# Patient Record
Sex: FEMALE | Race: WHITE | URBAN - METROPOLITAN AREA
[De-identification: names, ages, dates, MRNs, and addresses within clinical notes are randomized per-mention and may not be internally consistent; named-entity substitution may affect disease eponyms.]

---

## 2021-01-01 ENCOUNTER — APPOINTMENT (OUTPATIENT)
Dept: CT IMAGING | Age: 51
End: 2021-01-01
Payer: MEDICAID

## 2021-01-01 ENCOUNTER — HOSPITAL ENCOUNTER (INPATIENT)
Age: 51
LOS: 1 days | DRG: 064 | End: 2021-08-15
Attending: INTERNAL MEDICINE | Admitting: INTERNAL MEDICINE
Payer: MEDICAID

## 2021-01-01 ENCOUNTER — APPOINTMENT (OUTPATIENT)
Dept: GENERAL RADIOLOGY | Age: 51
DRG: 064 | End: 2021-01-01
Attending: INTERNAL MEDICINE
Payer: MEDICAID

## 2021-01-01 ENCOUNTER — HOSPITAL ENCOUNTER (EMERGENCY)
Age: 51
Discharge: ANOTHER HOSPITAL | End: 2021-08-14
Attending: EMERGENCY MEDICINE
Payer: MEDICAID

## 2021-01-01 ENCOUNTER — APPOINTMENT (OUTPATIENT)
Dept: GENERAL RADIOLOGY | Age: 51
End: 2021-01-01
Payer: MEDICAID

## 2021-01-01 ENCOUNTER — APPOINTMENT (OUTPATIENT)
Dept: CT IMAGING | Age: 51
DRG: 064 | End: 2021-01-01
Attending: INTERNAL MEDICINE
Payer: MEDICAID

## 2021-01-01 VITALS
BODY MASS INDEX: 24.26 KG/M2 | HEIGHT: 68 IN | TEMPERATURE: 99.3 F | OXYGEN SATURATION: 45 % | SYSTOLIC BLOOD PRESSURE: 134 MMHG | HEART RATE: 76 BPM | DIASTOLIC BLOOD PRESSURE: 101 MMHG | WEIGHT: 160.05 LBS

## 2021-01-01 VITALS
HEART RATE: 94 BPM | DIASTOLIC BLOOD PRESSURE: 83 MMHG | RESPIRATION RATE: 12 BRPM | TEMPERATURE: 93.5 F | WEIGHT: 167.55 LBS | SYSTOLIC BLOOD PRESSURE: 105 MMHG | OXYGEN SATURATION: 100 %

## 2021-01-01 DIAGNOSIS — I61.8 INTRACEREBELLAR AND POSTERIOR FOSSA HEMORRHAGE (HCC): Primary | ICD-10-CM

## 2021-01-01 DIAGNOSIS — I62.9 INTRACRANIAL HEMORRHAGE (HCC): Primary | ICD-10-CM

## 2021-01-01 DIAGNOSIS — I61.4 INTRACEREBELLAR AND POSTERIOR FOSSA HEMORRHAGE (HCC): Primary | ICD-10-CM

## 2021-01-01 DIAGNOSIS — I46.9 CARDIOPULMONARY ARREST WITH SUCCESSFUL RESUSCITATION (HCC): ICD-10-CM

## 2021-01-01 LAB
A/G RATIO: 1.6 (ref 1.1–2.2)
ACETAMINOPHEN LEVEL: <5 UG/ML (ref 10–30)
ALBUMIN SERPL-MCNC: 3.4 G/DL (ref 3.4–5)
ALBUMIN SERPL-MCNC: 3.9 G/DL (ref 3.4–5)
ALBUMIN SERPL-MCNC: 4.3 G/DL (ref 3.4–5)
ALP BLD-CCNC: 88 U/L (ref 40–129)
ALT SERPL-CCNC: 23 U/L (ref 10–40)
ANION GAP SERPL CALCULATED.3IONS-SCNC: 10 MMOL/L (ref 3–16)
ANION GAP SERPL CALCULATED.3IONS-SCNC: 15 MMOL/L (ref 3–16)
ANION GAP SERPL CALCULATED.3IONS-SCNC: 23 MMOL/L (ref 3–16)
AST SERPL-CCNC: 34 U/L (ref 15–37)
BASE EXCESS VENOUS: -13 MMOL/L (ref -3–3)
BASOPHILS ABSOLUTE: 0 K/UL (ref 0–0.2)
BASOPHILS ABSOLUTE: 0 K/UL (ref 0–0.2)
BASOPHILS RELATIVE PERCENT: 0.2 %
BASOPHILS RELATIVE PERCENT: 0.7 %
BILIRUB SERPL-MCNC: 0.4 MG/DL (ref 0–1)
BUN BLDV-MCNC: 10 MG/DL (ref 7–20)
BUN BLDV-MCNC: 14 MG/DL (ref 7–20)
BUN BLDV-MCNC: 17 MG/DL (ref 7–20)
CALCIUM SERPL-MCNC: 10.3 MG/DL (ref 8.3–10.6)
CALCIUM SERPL-MCNC: 8.6 MG/DL (ref 8.3–10.6)
CALCIUM SERPL-MCNC: 9.4 MG/DL (ref 8.3–10.6)
CHLORIDE BLD-SCNC: 102 MMOL/L (ref 99–110)
CHLORIDE BLD-SCNC: 112 MMOL/L (ref 99–110)
CHLORIDE BLD-SCNC: 119 MMOL/L (ref 99–110)
CO2: 14 MMOL/L (ref 21–32)
CO2: 22 MMOL/L (ref 21–32)
CO2: 26 MMOL/L (ref 21–32)
CREAT SERPL-MCNC: 1 MG/DL (ref 0.6–1.1)
CREAT SERPL-MCNC: 1.1 MG/DL (ref 0.6–1.1)
CREAT SERPL-MCNC: 1.2 MG/DL (ref 0.6–1.1)
D DIMER: >1.95 UG/ML FEU
EKG ATRIAL RATE: 103 BPM
EKG ATRIAL RATE: 103 BPM
EKG ATRIAL RATE: 110 BPM
EKG DIAGNOSIS: NORMAL
EKG P AXIS: 60 DEGREES
EKG P AXIS: 64 DEGREES
EKG P AXIS: 68 DEGREES
EKG P-R INTERVAL: 126 MS
EKG P-R INTERVAL: 144 MS
EKG P-R INTERVAL: 152 MS
EKG Q-T INTERVAL: 364 MS
EKG Q-T INTERVAL: 378 MS
EKG Q-T INTERVAL: 420 MS
EKG QRS DURATION: 104 MS
EKG QRS DURATION: 110 MS
EKG QRS DURATION: 92 MS
EKG QTC CALCULATION (BAZETT): 476 MS
EKG QTC CALCULATION (BAZETT): 511 MS
EKG QTC CALCULATION (BAZETT): 550 MS
EKG R AXIS: 28 DEGREES
EKG R AXIS: 34 DEGREES
EKG R AXIS: 50 DEGREES
EKG T AXIS: 237 DEGREES
EKG T AXIS: 58 DEGREES
EKG T AXIS: 61 DEGREES
EKG VENTRICULAR RATE: 103 BPM
EKG VENTRICULAR RATE: 103 BPM
EKG VENTRICULAR RATE: 110 BPM
EOSINOPHILS ABSOLUTE: 0 K/UL (ref 0–0.6)
EOSINOPHILS ABSOLUTE: 0.1 K/UL (ref 0–0.6)
EOSINOPHILS RELATIVE PERCENT: 0.4 %
EOSINOPHILS RELATIVE PERCENT: 1.1 %
ETHANOL: NORMAL MG/DL (ref 0–0.08)
GFR AFRICAN AMERICAN: 57
GFR AFRICAN AMERICAN: >60
GFR AFRICAN AMERICAN: >60
GFR NON-AFRICAN AMERICAN: 47
GFR NON-AFRICAN AMERICAN: 53
GFR NON-AFRICAN AMERICAN: 59
GLOBULIN: 2.1 G/DL
GLUCOSE BLD-MCNC: 168 MG/DL (ref 70–99)
GLUCOSE BLD-MCNC: 291 MG/DL (ref 70–99)
GLUCOSE BLD-MCNC: 454 MG/DL (ref 70–99)
GONADOTROPIN, CHORIONIC (HCG) QUANT: 5.2 MIU/ML
HCG QUALITATIVE: POSITIVE
HCO3 VENOUS: 14.7 MMOL/L (ref 23–29)
HCT VFR BLD CALC: 39.7 % (ref 36–48)
HCT VFR BLD CALC: 43.9 % (ref 36–48)
HCT VFR BLD CALC: 50.1 % (ref 36–48)
HEMOGLOBIN: 12.9 G/DL (ref 12–16)
HEMOGLOBIN: 15 G/DL (ref 12–16)
HEMOGLOBIN: 16.9 G/DL (ref 12–16)
INR BLD: 1.4 (ref 0.88–1.12)
LACTIC ACID, SEPSIS: 10.2 MMOL/L (ref 0.4–1.9)
LACTIC ACID: 1.8 MMOL/L (ref 0.4–2)
LYMPHOCYTES ABSOLUTE: 0.4 K/UL (ref 1–5.1)
LYMPHOCYTES ABSOLUTE: 2.4 K/UL (ref 1–5.1)
LYMPHOCYTES RELATIVE PERCENT: 44.5 %
LYMPHOCYTES RELATIVE PERCENT: 6.1 %
MAGNESIUM: 2 MG/DL (ref 1.8–2.4)
MAGNESIUM: 2.3 MG/DL (ref 1.8–2.4)
MCH RBC QN AUTO: 31.5 PG (ref 26–34)
MCH RBC QN AUTO: 32.6 PG (ref 26–34)
MCH RBC QN AUTO: 32.6 PG (ref 26–34)
MCHC RBC AUTO-ENTMCNC: 32.5 G/DL (ref 31–36)
MCHC RBC AUTO-ENTMCNC: 33.7 G/DL (ref 31–36)
MCHC RBC AUTO-ENTMCNC: 34.3 G/DL (ref 31–36)
MCV RBC AUTO: 95.1 FL (ref 80–100)
MCV RBC AUTO: 96.6 FL (ref 80–100)
MCV RBC AUTO: 97 FL (ref 80–100)
MONOCYTES ABSOLUTE: 0.2 K/UL (ref 0–1.3)
MONOCYTES ABSOLUTE: 0.3 K/UL (ref 0–1.3)
MONOCYTES RELATIVE PERCENT: 2.9 %
MONOCYTES RELATIVE PERCENT: 3.7 %
NEUTROPHILS ABSOLUTE: 2.8 K/UL (ref 1.7–7.7)
NEUTROPHILS ABSOLUTE: 6.2 K/UL (ref 1.7–7.7)
NEUTROPHILS RELATIVE PERCENT: 50.8 %
NEUTROPHILS RELATIVE PERCENT: 89.6 %
O2 SAT, VEN: 97 %
O2 THERAPY: ABNORMAL
PCO2, VEN: 35.7 MMHG (ref 40–50)
PDW BLD-RTO: 13.9 % (ref 12.4–15.4)
PDW BLD-RTO: 14.5 % (ref 12.4–15.4)
PDW BLD-RTO: 14.6 % (ref 12.4–15.4)
PH VENOUS: 7.22 (ref 7.35–7.45)
PHOSPHORUS: 4.3 MG/DL (ref 2.5–4.9)
PHOSPHORUS: 4.9 MG/DL (ref 2.5–4.9)
PLATELET # BLD: 103 K/UL (ref 135–450)
PLATELET # BLD: 120 K/UL (ref 135–450)
PLATELET # BLD: 95 K/UL (ref 135–450)
PLATELET SLIDE REVIEW: ADEQUATE
PMV BLD AUTO: 10.1 FL (ref 5–10.5)
PMV BLD AUTO: 9.4 FL (ref 5–10.5)
PMV BLD AUTO: 9.4 FL (ref 5–10.5)
PO2, VEN: 108.2 MMHG (ref 25–40)
POTASSIUM SERPL-SCNC: 3.2 MMOL/L (ref 3.5–5.1)
POTASSIUM SERPL-SCNC: 3.3 MMOL/L (ref 3.5–5.1)
POTASSIUM SERPL-SCNC: 4.4 MMOL/L (ref 3.5–5.1)
PRO-BNP: 231 PG/ML (ref 0–124)
PROTHROMBIN TIME: 27 SEC (ref 9.9–12.7)
RBC # BLD: 4.09 M/UL (ref 4–5.2)
RBC # BLD: 4.62 M/UL (ref 4–5.2)
RBC # BLD: 5.18 M/UL (ref 4–5.2)
RBC # BLD: NORMAL 10*6/UL
SALICYLATE, SERUM: 0.5 MG/DL (ref 15–30)
SODIUM BLD-SCNC: 139 MMOL/L (ref 136–145)
SODIUM BLD-SCNC: 149 MMOL/L (ref 136–145)
SODIUM BLD-SCNC: 155 MMOL/L (ref 136–145)
TCO2 CALC VENOUS: 16 MMOL/L
TOTAL PROTEIN: 5.5 G/DL (ref 6.4–8.2)
TROPONIN: 0.42 NG/ML
TROPONIN: 0.95 NG/ML
TROPONIN: <0.01 NG/ML
WBC # BLD: 14.3 K/UL (ref 4–11)
WBC # BLD: 5.5 K/UL (ref 4–11)
WBC # BLD: 7 K/UL (ref 4–11)

## 2021-01-01 PROCEDURE — 2580000003 HC RX 258: Performed by: STUDENT IN AN ORGANIZED HEALTH CARE EDUCATION/TRAINING PROGRAM

## 2021-01-01 PROCEDURE — 93005 ELECTROCARDIOGRAM TRACING: CPT | Performed by: STUDENT IN AN ORGANIZED HEALTH CARE EDUCATION/TRAINING PROGRAM

## 2021-01-01 PROCEDURE — 2500000003 HC RX 250 WO HCPCS: Performed by: STUDENT IN AN ORGANIZED HEALTH CARE EDUCATION/TRAINING PROGRAM

## 2021-01-01 PROCEDURE — 83735 ASSAY OF MAGNESIUM: CPT

## 2021-01-01 PROCEDURE — 83880 ASSAY OF NATRIURETIC PEPTIDE: CPT

## 2021-01-01 PROCEDURE — 70450 CT HEAD/BRAIN W/O DYE: CPT

## 2021-01-01 PROCEDURE — 6360000002 HC RX W HCPCS: Performed by: STUDENT IN AN ORGANIZED HEALTH CARE EDUCATION/TRAINING PROGRAM

## 2021-01-01 PROCEDURE — 85025 COMPLETE CBC W/AUTO DIFF WBC: CPT

## 2021-01-01 PROCEDURE — 84484 ASSAY OF TROPONIN QUANT: CPT

## 2021-01-01 PROCEDURE — 36592 COLLECT BLOOD FROM PICC: CPT

## 2021-01-01 PROCEDURE — 71045 X-RAY EXAM CHEST 1 VIEW: CPT

## 2021-01-01 PROCEDURE — 80179 DRUG ASSAY SALICYLATE: CPT

## 2021-01-01 PROCEDURE — 99284 EMERGENCY DEPT VISIT MOD MDM: CPT

## 2021-01-01 PROCEDURE — 99255 IP/OBS CONSLTJ NEW/EST HI 80: CPT | Performed by: PSYCHIATRY & NEUROLOGY

## 2021-01-01 PROCEDURE — 93010 ELECTROCARDIOGRAM REPORT: CPT | Performed by: INTERNAL MEDICINE

## 2021-01-01 PROCEDURE — 84702 CHORIONIC GONADOTROPIN TEST: CPT

## 2021-01-01 PROCEDURE — 80053 COMPREHEN METABOLIC PANEL: CPT

## 2021-01-01 PROCEDURE — 0BH18EZ INSERTION OF ENDOTRACHEAL AIRWAY INTO TRACHEA, VIA NATURAL OR ARTIFICIAL OPENING ENDOSCOPIC: ICD-10-PCS | Performed by: INTERNAL MEDICINE

## 2021-01-01 PROCEDURE — 94002 VENT MGMT INPAT INIT DAY: CPT

## 2021-01-01 PROCEDURE — 82947 ASSAY GLUCOSE BLOOD QUANT: CPT

## 2021-01-01 PROCEDURE — 80143 DRUG ASSAY ACETAMINOPHEN: CPT

## 2021-01-01 PROCEDURE — 94003 VENT MGMT INPAT SUBQ DAY: CPT

## 2021-01-01 PROCEDURE — 84703 CHORIONIC GONADOTROPIN ASSAY: CPT

## 2021-01-01 PROCEDURE — 36415 COLL VENOUS BLD VENIPUNCTURE: CPT

## 2021-01-01 PROCEDURE — 82803 BLOOD GASES ANY COMBINATION: CPT

## 2021-01-01 PROCEDURE — 2700000000 HC OXYGEN THERAPY PER DAY

## 2021-01-01 PROCEDURE — 82330 ASSAY OF CALCIUM: CPT

## 2021-01-01 PROCEDURE — 94761 N-INVAS EAR/PLS OXIMETRY MLT: CPT

## 2021-01-01 PROCEDURE — 83605 ASSAY OF LACTIC ACID: CPT

## 2021-01-01 PROCEDURE — 84132 ASSAY OF SERUM POTASSIUM: CPT

## 2021-01-01 PROCEDURE — APPNB45 APP NON BILLABLE 31-45 MINUTES: Performed by: NURSE PRACTITIONER

## 2021-01-01 PROCEDURE — 2000000000 HC ICU R&B

## 2021-01-01 PROCEDURE — 5A1935Z RESPIRATORY VENTILATION, LESS THAN 24 CONSECUTIVE HOURS: ICD-10-PCS | Performed by: INTERNAL MEDICINE

## 2021-01-01 PROCEDURE — 85379 FIBRIN DEGRADATION QUANT: CPT

## 2021-01-01 PROCEDURE — 80069 RENAL FUNCTION PANEL: CPT

## 2021-01-01 PROCEDURE — 99291 CRITICAL CARE FIRST HOUR: CPT | Performed by: INTERNAL MEDICINE

## 2021-01-01 PROCEDURE — 93005 ELECTROCARDIOGRAM TRACING: CPT | Performed by: EMERGENCY MEDICINE

## 2021-01-01 PROCEDURE — 85027 COMPLETE CBC AUTOMATED: CPT

## 2021-01-01 PROCEDURE — 82077 ASSAY SPEC XCP UR&BREATH IA: CPT

## 2021-01-01 RX ORDER — SODIUM CHLORIDE 0.9 % (FLUSH) 0.9 %
5-40 SYRINGE (ML) INJECTION EVERY 12 HOURS SCHEDULED
Status: DISCONTINUED | OUTPATIENT
Start: 2021-01-01 | End: 2021-01-01 | Stop reason: HOSPADM

## 2021-01-01 RX ORDER — LABETALOL HYDROCHLORIDE 5 MG/ML
10 INJECTION, SOLUTION INTRAVENOUS ONCE
Status: COMPLETED | OUTPATIENT
Start: 2021-01-01 | End: 2021-01-01

## 2021-01-01 RX ORDER — ACETAMINOPHEN 325 MG/1
650 TABLET ORAL EVERY 4 HOURS PRN
Status: DISCONTINUED | OUTPATIENT
Start: 2021-01-01 | End: 2021-01-01 | Stop reason: HOSPADM

## 2021-01-01 RX ORDER — SODIUM CHLORIDE 9 MG/ML
25 INJECTION, SOLUTION INTRAVENOUS PRN
Status: DISCONTINUED | OUTPATIENT
Start: 2021-01-01 | End: 2021-01-01 | Stop reason: HOSPADM

## 2021-01-01 RX ORDER — POTASSIUM CHLORIDE 29.8 MG/ML
20 INJECTION INTRAVENOUS
Status: COMPLETED | OUTPATIENT
Start: 2021-01-01 | End: 2021-01-01

## 2021-01-01 RX ORDER — ONDANSETRON 4 MG/1
4 TABLET, ORALLY DISINTEGRATING ORAL EVERY 8 HOURS PRN
Status: DISCONTINUED | OUTPATIENT
Start: 2021-01-01 | End: 2021-01-01

## 2021-01-01 RX ORDER — ONDANSETRON 2 MG/ML
4 INJECTION INTRAMUSCULAR; INTRAVENOUS EVERY 6 HOURS PRN
Status: DISCONTINUED | OUTPATIENT
Start: 2021-01-01 | End: 2021-01-01

## 2021-01-01 RX ORDER — SODIUM CHLORIDE, SODIUM LACTATE, POTASSIUM CHLORIDE, CALCIUM CHLORIDE 600; 310; 30; 20 MG/100ML; MG/100ML; MG/100ML; MG/100ML
INJECTION, SOLUTION INTRAVENOUS CONTINUOUS
Status: DISCONTINUED | OUTPATIENT
Start: 2021-01-01 | End: 2021-01-01

## 2021-01-01 RX ORDER — SODIUM CHLORIDE 9 MG/ML
INJECTION, SOLUTION INTRAVENOUS CONTINUOUS
Status: ACTIVE | OUTPATIENT
Start: 2021-01-01 | End: 2021-01-01

## 2021-01-01 RX ORDER — 0.9 % SODIUM CHLORIDE 0.9 %
500 INTRAVENOUS SOLUTION INTRAVENOUS ONCE
Status: DISCONTINUED | OUTPATIENT
Start: 2021-01-01 | End: 2021-01-01 | Stop reason: HOSPADM

## 2021-01-01 RX ORDER — SODIUM CHLORIDE 0.9 % (FLUSH) 0.9 %
5-40 SYRINGE (ML) INJECTION PRN
Status: DISCONTINUED | OUTPATIENT
Start: 2021-01-01 | End: 2021-01-01 | Stop reason: HOSPADM

## 2021-01-01 RX ORDER — LEVETIRACETAM 10 MG/ML
1000 INJECTION INTRAVASCULAR ONCE
Status: DISCONTINUED | OUTPATIENT
Start: 2021-01-01 | End: 2021-01-01 | Stop reason: HOSPADM

## 2021-01-01 RX ORDER — SODIUM CHLORIDE 9 MG/ML
50 INJECTION, SOLUTION INTRAVENOUS ONCE
Status: COMPLETED | OUTPATIENT
Start: 2021-01-01 | End: 2021-01-01

## 2021-01-01 RX ADMIN — SODIUM CHLORIDE 50 ML: 900 INJECTION, SOLUTION INTRAVENOUS at 05:00

## 2021-01-01 RX ADMIN — SODIUM CHLORIDE 3 MG/HR: 9 INJECTION, SOLUTION INTRAVENOUS at 03:24

## 2021-01-01 RX ADMIN — Medication 10 ML: at 23:29

## 2021-01-01 RX ADMIN — Medication 10 ML: at 09:43

## 2021-01-01 RX ADMIN — LABETALOL HYDROCHLORIDE 10 MG: 5 INJECTION, SOLUTION INTRAVENOUS at 01:18

## 2021-01-01 RX ADMIN — Medication 3560 UNITS: at 04:45

## 2021-01-01 RX ADMIN — POTASSIUM CHLORIDE 20 MEQ: 29.8 INJECTION, SOLUTION INTRAVENOUS at 23:47

## 2021-01-01 RX ADMIN — POTASSIUM CHLORIDE 20 MEQ: 29.8 INJECTION, SOLUTION INTRAVENOUS at 01:09

## 2021-01-01 RX ADMIN — SODIUM CHLORIDE: 9 INJECTION, SOLUTION INTRAVENOUS at 23:26

## 2021-01-01 RX ADMIN — LEVETIRACETAM 500 MG: 100 INJECTION, SOLUTION INTRAVENOUS at 00:51

## 2021-01-01 ASSESSMENT — PULMONARY FUNCTION TESTS
PIF_VALUE: 18
PIF_VALUE: 19
PIF_VALUE: 17
PIF_VALUE: 18
PIF_VALUE: 18
PIF_VALUE: 19
PIF_VALUE: 18
PIF_VALUE: 18
PIF_VALUE: 19
PIF_VALUE: 19
PIF_VALUE: 18
PIF_VALUE: 19
PIF_VALUE: 18
PIF_VALUE: 19
PIF_VALUE: 19
PIF_VALUE: 18
PIF_VALUE: 19
PIF_VALUE: 18
PIF_VALUE: 19
PIF_VALUE: 19

## 2021-08-14 PROBLEM — I62.9 INTRACRANIAL HEMORRHAGE (HCC): Status: ACTIVE | Noted: 2021-01-01

## 2021-08-14 NOTE — ED PROVIDER NOTES
[facility]  eMERGENCY dEPARTMENT eNCOUnter      Pt Name: Michael Coyle  MRN: [id number]  [place] 1970  Date of evaluation: 8/14/2021  Provider: Francesca Castelan MD    [address] [place] [address]       Chief Complaint   Patient presents with    Loss of Consciousness         CRITICAL CARE TIME   Total Critical Care time was a minimum of ninety minutes, excluding separately reportable procedures. There was a high probability of clinically significant/life threatening deterioration in the patient's condition which required my urgent intervention. Critical CARE includes my initial evaluation, ongoing bedside care, review of laboratory, EKG, chest x-ray, CT scan, consultation with neurosurgery, consultation with hospitalist, consultation with the air care physician and nurse, obtaining additional medical history from the family, no procedure time was included. HISTORY OF PRESENT ILLNESS  (Location/Symptom, Timing/Onset, Context/Setting, Quality, Duration, Modifying Factors, Severity.)   Michael Coyle is a 49 y.o. female who presents to the emergency department in cardiopulmonary arrest.  History obtained from EMS is that the call went out at 6:04 PM.  When they arrived on scene there was no bystander CPR. They state that individuals on scene told him she was last seen normal at 1:30 PM.  She was apparently in a bed. They went to wake her and found her unresponsive sometime between 5:30 PM and the time the call went out. There was no bystander CPR on their arrival.  The initial rhythm on arrival on scene was ventricular fibrillation. The basic squad gave 2 mg of Narcan intranasal.  They defibrillated once. The medic arrived on scene. An LMA was placed. A peripheral IV was established. The patient was transported here. In route the patient remained in ventricular fibrillation. She was defibrillated 2 additional times for a total of 3 defibrillations in route.   She was given 3 mg of epinephrine in route. No additional history obtained at the scene other than the patient had a history of hypertension. 2010: No history obtained from the patient's fiancé and daughter. When she got up this morning she felt fine. Around 1030 she began complaining of a headache that she called \"migraine\". She took some over-the-counter medications including naproxen. Her fiancé said her headache was so bad that she was screaming that the noises were bothering her and the light was bothering her eyes. They state that she laid down on the couch. Her fiancé and daughter went to work. That came back to check on her around 5:45 and found her unresponsive and could not arouse her. They state her blood pressure is generally high. Nursing Notes were reviewed and I agree. REVIEW OF SYSTEMS    (2-9 systems for level 4, 10 or more for level 5)     Unable to obtain. Patient is in cardiopulmonary arrest.  Except as noted above the remainder of the review of systems was reviewed and negative. PAST MEDICAL HISTORY   No past medical history on file. SURGICAL HISTORY     No past surgical history on file. CURRENT MEDICATIONS       There are no discharge medications for this patient. ALLERGIES     Patient has no allergy information on record. FAMILY HISTORY     No family history on file.        SOCIAL HISTORY       Social History     Socioeconomic History    Marital status: Unknown     Spouse name: Not on file    Number of children: Not on file    Years of education: Not on file    Highest education level: Not on file   Occupational History    Not on file   Tobacco Use    Smoking status: Not on file   Substance and Sexual Activity    Alcohol use: Not on file    Drug use: Not on file    Sexual activity: Not on file   Other Topics Concern    Not on file   Social History Narrative    Not on file     Social Determinants of Health     Financial Resource Strain:     Difficulty of Paying Living Expenses:    Food Insecurity:     Worried About Running Out of Food in the Last Year:     [address] in the Last Year:    Transportation Needs:     Lack of Transportation (Medical):  Lack of Transportation (Non-Medical):    Physical Activity:     Days of Exercise per Week:     Minutes of Exercise per Session:    Stress:     Feeling of Stress :    Social Connections:     Frequency of Communication with Friends and Family:     Frequency of Social Gatherings with Friends and Family:     Attends Jewish Services:     Active Member of Clubs or Organizations:     Attends Club or Organization Meetings:     Marital Status:    Intimate Partner Violence:     Fear of Current or Ex-Partner:     Emotionally Abused:     Physically Abused:     Sexually Abused:          PHYSICAL EXAM    (up to 7 for level 4, 8 or more for level 5)     ED Triage Vitals   BP Temp Temp src Pulse Resp SpO2 Height Weight   -- -- -- -- -- -- -- --       General: Unresponsive white female arrives with CPR in progress. No eye-opening, no verbal, no motor, GCS of 3. Head: Atraumatic. Eyes: Pupils 8 mm, nonreactive, midline. ENT: Facial trauma. Nose is clear. Poor dentition. No vomitus in the oropharynx. No gag reflex. Neck: Supple, no thyromegaly. Heart: Heart tones absent. Femoral pulses with CPR. Lungs: No spontaneous respirations. Breath sounds symmetrical with bagging with LMA in place. Musculoskeletal: No obvious external signs of trauma to the upper and lower extremities. No obvious edema. No palpable distal pulses. No palpable femoral pulses. Skin: Pale, cool, no diaphoresis. No cyanosis. Neuro: No eye-opening, no verbal, no motor, GCS of 3. DIFFERENTIAL DIAGNOSIS   Differential includes but is not limited to myocardial infarction, pulmonary embolism, drug overdose, electrolyte abnormality, intracerebral hemorrhage, sepsis, other.       DIAGNOSTIC RESULTS     EKG: All EKG's are interpreted by Amparo Buck MD in the absence of a cardiologist.    Sinus tachycardia, rate 103, lateral ischemic changes. Rhythm strip shows sinus tachycardia with a rate of 103, GA interval 144 ms,  ms with no other ectopy as interpreted by me. No old EKG available for comparison. RADIOLOGY:   Non-plain film images such as CT, Ultrasound and MRI are read by the radiologist. Plain radiographic images are visualized and preliminarily interpreted Amparo Buck MD with the below findings:      Interpretation per the Radiologist below, if available at the time of this note:    XR CHEST PORTABLE   Final Result   The ET tube was in satisfactory position, 4.1 cm above the trinh. No cardiomegaly, pneumonia or interstitial edema. No rib fracture or pneumothorax. Mild superior mediastinal widening was noted but this could be due to the   supine position. CT HEAD WO CONTRAST   Final Result   1. Acute posterior fossa hemorrhage causing cerebellar tonsillar herniation,   superior transtentorial herniation, and mild hydrocephalus. 2.  Diffuse cerebral edema. MRI would better evaluate for underlying diffuse   anoxic injury. Critical results were called by Dr. Torie Gastelum DO to Dr. Josiah Crook on   8/14/2021 at 20:29.                ED BEDSIDE ULTRASOUND:   Performed by ED Physician - none    LABS:  Labs Reviewed   BLOOD GAS, VENOUS - Abnormal; Notable for the following components:       Result Value    pH, Jorge 7.222 (*)     pCO2, Jorge 35.7 (*)     pO2, Jorge 108.2 (*)     HCO3, Venous 14.7 (*)     Base Excess, Jorge -13.0 (*)     All other components within normal limits    Narrative:     Performed at:  [facility]) - [address]   Phone [phone]   CBC WITH AUTO DIFFERENTIAL - Abnormal; Notable for the following components:    Platelets 95 (*)     All other components within normal limits Narrative:     Performed at:  [address] [place] [address]  [address] [facility]   Phone [phone]   COMPREHENSIVE METABOLIC PANEL - Abnormal; Notable for the following components:    Potassium 3.2 (*)     CO2 14 (*)     Anion Gap 23 (*)     Glucose 454 (*)     CREATININE 1.2 (*)     GFR Non- 47 (*)     GFR  57 (*)     Total Protein 5.5 (*)     All other components within normal limits    Narrative:     Jodi Villaseñor tel. [phone],  Chemistry results called to and read back by dr Fernando Jo, 08/14/2021 19:49, by  Sushant Faith  Performed at:  [facility]) [facility]  [address] [facility]   Phone [phone]   BRAIN NATRIURETIC PEPTIDE - Abnormal; Notable for the following components:    Pro- (*)     All other components within normal limits    Narrative:     Jodi Villaseñor tel. [phone],  Chemistry results called to and read back by dr Fernando Jo, 08/14/2021 19:49, by  Sushatn Faith  Performed at:  [facility]) [facility]  [address] [facility]   Phone [phone]   LACTATE, SEPSIS - Abnormal; Notable for the following components:    Lactic Acid, Sepsis 10.2 (*)     All other components within normal limits    Narrative:     Jodi Charleen tel. [phone],  Chemistry results called to and read back by dr Fernando Jo, 08/14/2021 19:49, by  Sushant Faith  Performed at:  [facility]) [facility]  [address] [facility]   Phone [phone]   D-DIMER, QUANTITATIVE - Abnormal; Notable for the following components:    D-Dimer, Quant >1.95 (*)     All other components within normal limits    Narrative:     Performed at:  [facility]) [facility]  [address] [facility]   Phone [phone]   PROTIME-INR - Abnormal; Notable for the following components:    Protime 27.0 (*) INR 1.40 (*)     All other components within normal limits    Narrative:     Performed at:  [facility]  [address] [facility] [employer] [address]   Phone [phone]   ACETAMINOPHEN LEVEL - Abnormal; Notable for the following components:    Acetaminophen Level <5 (*)     All other components within normal limits    Narrative:     Performed at:  [facility]  [address] [facility] [employer] [address]   Phone [phone]   SALICYLATE LEVEL - Abnormal; Notable for the following components:    Salicylate, Serum 0.5 (*)     All other components within normal limits    Narrative:     Performed at:  [facility]  [address] [employer] [address]   Phone [phone]   TROPONIN    Narrative:     Yaneth Velasco tel. [phone],  Chemistry results called to and read back by dr Ling Blanco, 08/14/2021 19:49, by  Candelario Apley  Performed at:  [facility]  [address] [facility]   Phone [phone]   HCG, SERUM, QUALITATIVE    Narrative:     Performed at:  [address] [place], [place] [address] [address] [facility]   Phone [phone]   ETHANOL    Narrative:     Performed at:  [address] [place] [address]  [address] [facility]   Phone [phone]   HCG, QUANTITATIVE, PREGNANCY    Narrative:     Performed at:  [facility]  [address] [facility]   Phone [phone]   LACTATE, SEPSIS   URINE DRUG SCREEN   URINE RT REFLEX TO CULTURE       All other labs were within normal range or not returned as of this dictation.     EMERGENCY DEPARTMENT COURSE and DIFFERENTIAL DIAGNOSIS/MDM:   Vitals:    Vitals:    08/14/21 2005 08/14/21 2015 08/14/21 2020 08/14/21 2050   BP: (!) 78/62 (!) 148/104 105/83    Pulse: 84 103 94    Resp: 10 13 12 Temp:    93.5 °F (34.2 °C)   TempSrc:    Rectal   SpO2: 100% 100% 100%    Weight:    167 lb 8.8 oz (76 kg)       On arrival CPR was continued. Records ministration was continued as documented. She was intubated as noted below with a 7.5 endotracheal tube on the first attempt. She was ventilated with a bag valve mask. Her initial rhythm here was ventricular fibrillation. She received 1 dose of epinephrine here. She received 3 defibrillations. After the second defibrillation she did have a transient rhythm consistent with PEA with no pulse, but it rapidly deteriorated into ventricular fibrillation. After her third defibrillation she had a return of rhythm with a pulse. At that point in time a central line was placed as documented below. He was given an amp of bicarb because her blood pressures were low and she had a prolonged downtime is likely profoundly acidotic. She had already gotten 1 L of normal saline. Another 500 cc bolus of saline was ordered while the Levophed drip was getting mixed and hung. Her initial work-up was ordered. 1952: Patient back from CT. CT reviewed by me. Large intracerebral hemorrhage in the posterior fossa. Call placed by me to access center to consult neurosurgery stat. 1955: Patient back from CT and reevaluated. Systolic blood pressure 68. VBG shows she is profoundly acidotic with a pH of 6.9. Amp of bicarb was ordered. Her Levophed was increased. 2005: Discussed with neurosurgery, Dr. Daniel Burt.  [facility]  Based on her clinical picture and her CT appearance we both feel that her prognosis is poor. I have discussed the presentation, resuscitation, CT findings and prognosis with the patient's fiancé and daughter. They understand that her prognosis is poor. The hospitalist[facility]intensivist [facility]. will be contacting me to accept the patient for transfer.     2025:  CT Head results called by Dr. Karla Fierro, [facility]. 2033:  Discussed with Dr. Amy Wall, [facility] patient for transfer. Due to her mildly elevated INR he did request Kcentra. We do not have Kcentra here at the facility. Due to her mildly decreased platelet count he recommended platelet transfusion. We do not have blood products here at this facility. Per our discussion we will dispatch your care. 2038: Discussed with  [facility]. They will transport. 2121: As air care was landing the patient went into asystole. CPR was initiated. She was resuscitated for approximately 10 to 12 minutes and was given epinephrine, bicarb, calcium. [facility] had hypertonic saline on board and it was administered. [facility] had fresh frozen plasma on board, and it has been started. The Keppra had not been given here yet, they started the [address]. This patient presented in cardiopulmonary arrest with a GCS of three as above. Additional history obtained from the family after they arrived was that she had a headache that started at 10:30 AM this morning. When she had gotten up early in the day she felt fine. She thought she had a \"migraine\" although she does not have a medical history of migraines. She does have a history of hypertension. She took some over-the-counter medications including naproxen. Her fiancé told me that she complained of the light hurting her eyes and the noises were bothering her because of her headache. He states that around one thirty they left the house both he and the patient's daughter to do some work. She was laying on the couch at the time. They came back around quarter ~6 to check on the patient and they could not arouse her. EMS states they were dispatched at 6:04 PM.  On their arrival to the scene the patient was in cardiopulmonary arrest and ventricular fibrillation. The initial BLS squad that responded gave her Narcan and defibrillated her once. A medic responded to the scene. LMA was placed. An IV was established. Patient was defibrillated and given epinephrine as described above. The patient arrived here approximately 45 to 50 minutes after the call went out. The patient was still undergoing CPR, she was pulseless and apneic. She was in ventricular fibrillation. We continued ALS protocol including epinephrine, bicarb, and defibrillation. She was intubated. We obtained ROSC. Her initial rhythm on the monitor looked like atrial fibrillation with RVR. Her initial blood pressures post resuscitation were low. Central venous access was obtained. She had already been given a liter of IV fluids at the time she was resuscitated. She was given another 500 cc of normal saline. She was started on Levophed. Once we were able to resuscitate her and stabilize her we took her immediately to CT scan. At that point in time I noted that she had a large posterior circulation intracerebral hemorrhage. I placed an immediate consult to neurosurgery. I talk with neurosurgery as documented above. He agreed that the patient's prognosis was poor based on her presentation, her prolonged resuscitation, her large posterior fossa bleed, and what appears to be verbal edema on CT. He agreed that she should be transferred to [facility]..  I spoke with the hospitalist at [facility]..  As noted above he requested Kcentra and platelets. We did not have Kcentra or platelets here. As agreed we dispatched air care. As they landed the patient arrested again. The air care physician and nurse assisted in resuscitation including as noted above administration of a unit of fresh frozen plasma and hypertonic saline which they provided. They also hung the [address] which had not been started yet. We were ultimately able to resuscitate the patient. She was ultimately transferred via air care to [facility]..  As noted above the patient's fiancé and daughter were here.   I discussed the patient's work-up, test results, and prognosis. They understand the prognosis and understand the need for transfer. CONSULTS:  IP CONSULT TO NEUROSURGERY    PROCEDURES:  Intubation    Date/Time: 8/14/2021 7:36 PM  Performed by: Chelsea Schirmer, MD  Authorized by: Chelsea Schirmer, MD     Consent:     Consent obtained:  Emergent situation  Pre-procedure details:     Patient status:  Unresponsive    Mallampati score:  II    Pretreatment medications:  None    Paralytics:  None  Procedure details:     Preoxygenation:  RUBIO/LMA    CPR in progress: yes      Intubation method:  Oral    Oral intubation technique:  Direct    Laryngoscope blade: Mac 4    Tube size (mm):  7.5    Tube type:  Cuffed    Number of attempts:  1    Cricoid pressure: no      Tube visualized through cords: yes    Placement assessment:     ETT to lip:  22    Tube secured with:  ETT sequeira    Breath sounds:  Equal and absent over the epigastrium    Placement verification: chest rise, CXR verification, equal breath sounds and ETCO2 detector      CXR findings:  ETT in proper place  Post-procedure details:     Patient tolerance of procedure: Tolerated well, no immediate complications  Central Line    Date/Time: 8/14/2021 7:37 PM  Performed by: Chelsea Schirmer, MD  Authorized by: Chelsea Schirmer, MD     Consent:     Consent obtained:  Emergent situation  Pre-procedure details:     Hand hygiene: Hand hygiene performed prior to insertion      Sterile barrier technique: All elements of maximal sterile technique followed      Skin preparation:  2% chlorhexidine    Skin preparation agent: Skin preparation agent completely dried prior to procedure    Anesthesia (see MAR for exact dosages):      Anesthesia method:  None  Procedure details:     Location:  R femoral    Patient position:  Flat    Procedural supplies:  Triple lumen    Catheter size:  7 Fr    Landmarks identified: yes      Ultrasound guidance: no      Number of attempts:  2    Successful placement: yes    Post-procedure details:     Post-procedure:  Dressing applied and line sutured    Assessment:  Blood return through all ports and free fluid flow    Patient tolerance of procedure: Tolerated well, no immediate complications  Comments:      Access was obtained when the patient's blood pressure was 54 systolic. The the access looked venous in appearance, the blood was dark, there was a slow drip of blood from the needle, I felt that it was the femoral vein and proceeded. FINAL IMPRESSION      1. Intracerebellar and posterior fossa hemorrhage ([address]    2. Cardiopulmonary arrest with successful resuscitation ([address]          DISPOSITION/PLAN   DISPOSITION        PATIENT REFERRED TO:  No follow-up provider specified. DISCHARGE MEDICATIONS:  There are no discharge medications for this patient.       (Please note that portions of this note were completed with a voice recognition program.  Efforts were made to edit the dictations but occasionally words are mis-transcribed.)    Monika Coto MD  Attending Emergency Physician        Daysi Dietz MD  08/14/21 2056

## 2021-08-15 PROBLEM — I46.9 CARDIAC ARREST (HCC): Status: ACTIVE | Noted: 2021-01-01

## 2021-08-15 PROBLEM — G93.1 ANOXIC BRAIN INJURY (HCC): Status: ACTIVE | Noted: 2021-01-01

## 2021-08-15 NOTE — CONSULTS
Neurology Consult Note  Reason for Consult: brain death  Chief complaint: brain death  Dr Fercho Rider, DO asked me to see Alejandro Bautista in consultation for evaluation of brain death. History is obtained via chart review. History of Present Illness:  Alejandro Bautista is a 49 y.o. female with hypertension who presents s/p cardiopulmonary arrest. EMS were called last night at 6:04 PM. She was apparently found in her bed around 5:45, having been last seen normal at 1:30 PM prior to her family leaving for work. She had been complaining of a headache with associated photophobia, phonophobia around 10:30 AM and took naproxen. There was no bystander CPR. She was found in ventricular fibrillation, and was in ventricular fibrillation upon arrival to the ER in [facility]. Head CT showed large posterior fossa ICH. She went into asystole prior to air care transport and was coded for another 10-12 mintues. She was transferred to [facility] for further evaluation. Medical History:  No past medical history on file. No past surgical history on file. No medications prior to admission. Not on File  No family history on file. Social History     Tobacco Use   Smoking Status Not on file     Social History     Substance and Sexual Activity   Drug Use Not on file     Social History     Substance and Sexual Activity   Alcohol Use Not on file       ROS:  Unable to assess given encephalopathy     Exam:  Blood pressure (!) 123/94, pulse 111, temperature 97.7 °F (36.5 °C), temperature source Rectal, resp. rate 16, height 5' 8\" (1.727 m), weight 160 lb 0.9 oz (72.6 kg), SpO2 98 %. Constitutional    Vital signs: BP, HR, and RR reviewed   General depressed mental status, no distress, well-nourished  Eyes: fundoscopic exam difficult given inability to cooperate  Cardiovascular: pulses symmetric in all 4 extremities. No peripheral edema.   Psychiatric: limited exam given encephalopathy but not agitated and no signs of hallucinations  Neurologic  Mental status: limited exam given encephalopathy  orientation unable to assess given comatose state  General fund of knowledge limited exam given encephalopathy   Memory limited exam given encephalopathy  Attention no regard for surroundings or environment  Language makes no attempt to speak or communicate  Comprehension not following any commands  CN2: Visual Fields: no blink to either side with threat  CN 3,4,6:  extraocular muscles absent. Pupils dilated at 8 mm and non-reactive  CN5: absent corneal reflexes bilaterally   CN7:face symmetric but exam limited by ET tube  CN8: hearing JASON given encephalopathy   CN9: limited exam given encephalopathy  CN11: limited exam given encephalopathy  CN12: limited exam given encephalopathy  Strength: 0/5 throughout  Deep tendon reflexes: absent  Sensory: no response to central pain or pain in upper or lower limbs  Cerebellar/coordination:limited exam given encephalopathy  Tone: reduced throughout  Gait: limited exam given encephalopathy and intubated with ventilator. Labs  Na-155  Lactate 10.2  Troponin 0.95  WBC 14.3  Platelets 497J  INR 1.4      Studies  Head CT 8/15/21  Overall decreased size of hemorrhage in the cerebellum, although still    prominent size (currently 3.3 cm, previously 5.7 cm).     Intraventricular hemorrhage is again seen.  No hydrocephalus. Scheduled Medications   sodium chloride flush  5-40 mL Intravenous 2 times per day    levetiracetam  500 mg Intravenous Q12H     Impression  Shannan Boston is a 49 y.o. female who presented s/p prolonged (>1h) cardiac arrest and subsequent recurrent arrest found to have large cerebellar ICH with evidence of herniation. She has no brainstem reflexes on my visit and is not breathing over the ventilator.      Recommendations:  - Continue supportive care until goals of care can be addressed with family  - Will need apnea testing  - INR goal < 1.4; Platelet count < 957C, SBP less than 160 mmHg  - Neurosurgery consulted w/o plans for intervention     A copy of this note was provided for Dr Karen Billings DO. Assessment and planning including emergent interventions necessary were discussed with FORD Myers at 8:30 AM.     I attempted to call every phone number listed in the patients chart in an attempt to contact family and answer any questions they may have but my calls were unanswered. Zee Cabrera NP  Neurology and Neurocritical care  [facility] Neurology line: [phone]  [employer]: [facility] Neurology & Neurocritical Care NPs    I spent 45 minutes in the care of this patient. Over 50% of that time was in face-to-face counseling regarding disease process, diagnostic testing, preventative measures, and answering patient and family questions.

## 2021-08-15 NOTE — PROGRESS NOTES
Hospitalist Progress Note      PCP: No primary care provider on file. Date of Admission: 8/14/2021    Chief Complaint: Found unresponsive    Hospital Course: 72-year-old female admitted to the hospital after found unresponsive requiring CPR for around an hour. Noted to have large posterior fossa hemorrhage causing cerebellar tonsillar herniation, superior trans-tentorial herniation    Subjective: Patient remains unresponsive on the ventilator      Medications:  Reviewed    Infusion Medications    niCARdipine 2.5 mg/hr (08/15/21 0600)    sodium chloride      norepinephrine Stopped (08/14/21 2329)     Scheduled Medications    sodium chloride flush  5-40 mL Intravenous 2 times per day    levetiracetam  500 mg Intravenous Q12H     PRN Meds: sodium chloride flush, sodium chloride, acetaminophen      Intake/Output Summary (Last 24 hours) at 8/15/2021 1317  Last data filed at 8/15/2021 1200  Gross per 24 hour   Intake 330.49 ml   Output 5050 ml   Net -4719.51 ml       Physical Exam Performed:    BP (!) 139/92   Pulse 125   Temp 99.3 °F (37.4 °C) (Rectal)   Resp 16   Ht 5' 8\" (1.727 m)   Wt 160 lb 0.9 oz (72.6 kg)   SpO2 98%   BMI 24.34 kg/m²     General appearance: Intubated  HEENT: Pupils dilated and nonreactive, no gag reflex noted  Neck: Supple, with full range of motion. No jugular venous distention. Trachea midline. Respiratory: Intubated, mechanical breath sounds  Cardiovascular: Regular rate and rhythm with normal S1/S2 without murmurs, rubs or gallops. Abdomen: Soft, non-tender, non-distended with normal bowel sounds. Skin: Skin color, texture, turgor normal.  No rashes or lesions.   Neurologic: Does not withdraw to pain  Psychiatric: Unable to assess  Capillary Refill: Brisk,3 seconds, normal   Peripheral Pulses: +2 palpable, equal bilaterally       Labs:   Recent Labs     08/14/21  1916 08/14/21  2307 08/15/21  0440   WBC 5.5 7.0 14.3*   HGB 12.9 15.0 16.9*   HCT 39.7 43.9 50.1*   PLT 95* 103* 120*     Recent Labs     08/14/21 1916 08/14/21 2308 08/15/21  0440    149* 155*   K 3.2* 3.3* 4.4    112* 119*   CO2 14* 22 26   BUN 10 14 17   CREATININE 1.2* 1.1 1.0   CALCIUM 8.6 9.4 10.3   PHOS  --  4.3 4.9     Recent Labs     08/14/21 1916   AST 34   ALT 23   BILITOT 0.4   ALKPHOS 88     Recent Labs     08/14/21 1916   INR 1.40*     Recent Labs     08/14/21 1916 08/14/21 2308 08/15/21  0440   TROPONINI <0.01 0.42* 0.95*       Urinalysis:    No results found for: [facility], BACTERIA, RBCUA, BLOODU, SPECGRAV, GLUCOSEU    Radiology:  XR CHEST 1 VIEW   Final Result      ET tube in proper position. NG tube in the fundus, its side port above the GE junction as described. It should be advanced further for better positioning. No acute cardiopulmonary findings. CT head without contrast   Final Result        Overall decreased size of hemorrhage in the cerebellum, although still    prominent size (currently 3.3 cm, previously 5.7 cm). Intraventricular hemorrhage is again seen. No hydrocephalus. Assessment:  V. fib cardiac arrest  Acute anoxic encephalopathy  Large intracranial hemorrhage  Acute respiratory failure requiring intubation  Hypernatremia with concern for diabetes insipidus  Lactic acidosis  Elevated troponins    Plan:  -Patient is currently intubated in the ICU. Patient's prognosis is extremely poor given the exam and concern for clinically brain dead status. She is still full code for now. Palliative care has been consulted. Recommend comfort care approach. Neurology and neurosurgery on board. No acute surgical intervention is planned for the [address]  -Continue Keppra for seizure prophylaxis  -Continue to monitor sodium levels    DVT Prophylaxis: SCD  Diet: Diet NPO  Code Status: Full Code    Disposition-  poor prognosis.   Recommend comfort care/hospice    Nancy Gonzáles MD

## 2021-08-15 NOTE — DEATH NOTES
Death Pronouncement Note  Patient's Name: Faiza Saucedo   Patient's YOB: 1970  MRN Number: [id number]    Admitting Provider: Cortez Rangel DO  Attending Provider: Abdi Portillo MD    Patient was examined and the following were absent: Pulses, Blood Pressure and Respiratory effort    I declared the patient dead on 08/15/2021 at 13:57       Preliminary Cause of Death:    Irreversible brain injury secondary to cardiac arrest.    Electronically signed by Lucas Angulo MD on 8/15/21 at 1:59 PM EDT

## 2021-08-15 NOTE — PROGRESS NOTES
4 Eyes Admission Assessment     I agree as the admission nurse that 2 RN's have performed a thorough Head to Toe Skin Assessment on the patient. ALL assessment sites listed below have been assessed on admission. Areas assessed by both nurses:   [x]   Head, Face, and Ears   [x]   Shoulders, Back, and Chest  [x]   Arms, Elbows, and Hands   [x]   Coccyx, Sacrum, and Ischium non-blanchable redness  [x]   Legs, Feet, and Heels        Does the Patient have Skin Breakdown?   No         Yogi Prevention initiated:  Yes   Wound Care Orders initiated:  NA      [facility] nurse consulted for Pressure Injury (Stage 3,4, Unstageable, DTI, NWPT, and Complex wounds) or Yogi score 18 or lower:  NA      Nurse 1 eSignature: Electronically signed by Akanksha aNva RN on 8/15/21 at 1:12 AM EDT    **SHARE this note so that the co-signing nurse is able to place an eSignature**    Nurse 2 eSignature: Electronically signed by Dontrell Lu RN on 8/15/21 at 3:05 PM EDT

## 2021-08-15 NOTE — PROGRESS NOTES
Pt admitted to ICU. Pt pupils equal, not reactive, no movement to painful stimulus. Pt temp 91.4, jeri hugger in place. A-line inserted per residents, levo paused at this time. Skin assessment completed, non-blanchable redness noted on sacrum, sacral heart applied. Safety precautions in place, bed alarm on. Will continue to monitor.

## 2021-08-15 NOTE — DISCHARGE SUMMARY
INTERNAL MEDICINE DEPARTMENT AT [address] [place] [address]  DISCHARGE SUMMARY    Patient ID: Francisco Javier Wang                                             Discharge Date: 8/15/2021   Patient's PCP: No primary care provider on file. Discharge Physician: Grace Short MD MD  Admit Date: 8/14/2021   Admitting Physician: Elis Silva DO    DISCHARGE DIAGNOSES:  Hospital Problems         Last Modified POA    Intracranial hemorrhage ([address] 8/14/2021 Yes    Cardiac arrest ([address] 8/15/2021 Yes    Anoxic brain injury ([address] 8/15/2021 Yes    Acute respiratory failure with hypoxia ([address] 8/15/2021 Yes        Hospital Course:      Francisco Javier Wang is a 49 y.o. female h/o HTN hepC admitted to the ED for LOC.    Patient was having very severe headache at 10:30 AM yesterday. Patient had taken some naproxen which did not relieve her symptoms. Around 1:30 PM patient laid on the couch and her fiancé and daughter went to work. Last seen normal was 1:30 PM.  When he returned from work around 5:45 PM patient was unarousable. EMS was called, patient suffered vfib arrest, CPR was initiated. On arrival to the ED patient was still undergoing CPR, continued to be in V. fib. Received resuscitation for about an hour, ROSC was achieved after. CT scan of the brain immediately post ROSC showed large posterior circulation intracerebral hemorrhage. Keppra was administered for seizure prophylaxis. On arrival to the ICU patient did not have gag reflex and there was concern for irreversible brain injury. On hospital day 2 patient did not show any improvement in brain function. Family decided to withdraw care. Patient passed away on 8/15/2021 at 1357.     Physical Exam:  BP (!) 134/101   Pulse 76   Temp 99.3 °F (37.4 °C) (Rectal)   Resp (!) 0   Ht 5' 8\" (1.727 m)   Wt 160 lb 0.9 oz (72.6 kg)   SpO2 (!) 45%   BMI 24.34 kg/m²     Refer to death note exam    Consults:  IP CONSULT TO NEUROCRITICAL CARE  IP CONSULT TO PHARMACY  PHARMACY TO CHANGE BASE FLUIDS  IP CONSULT TO NEUROSURGERY  IP CONSULT TO PALLIATIVE CARE  IP CONSULT TO NEUROLOGY    Disposition:     Time Spent on discharge is more than 45 minutes    Signed:  Althea Finley MD   8/15/2021

## 2021-08-15 NOTE — CONSULTS
Neurosurgery Consult:    Patient Name: Yadira Otto YOB: 1970   Sex: Female Age: 49 yrs     Medical Record Number: [id number] Acct Number: [de-identified]   Room Number: 2038/7573-92 Hospital Day: Hospital Day: 2     Requesting physician: Esther Lockett DO    Reason for consultation: Cerebellar hemorrhage    History of present illness: Patient is a 49 y.o. female w/ PMH of HTN who was found down by family at 18:04. On arrival of EMS, patient reported anoxic with V Fib for unknown period of time without CPR. ACLS resuscitation initiated and patient intubated. Remained in V fib for duration of transport with reported ROSC in ED at 19:15. Without sedation, was GCS 3 with fixed pupils at OSH and has remained in that state since transfer to [facility].     ROS:   Unable to obtain 2/2 patient condition. VITAL SIGNS   BP (!) 123/94   Pulse 111   Temp 97.7 °F (36.5 °C) (Rectal)   Resp 16   Ht 5' 8\" (1.727 m)   Wt 160 lb 0.9 oz (72.6 kg)   SpO2 98%   BMI 24.34 kg/m²    Height Height: 5' 8\" (172.7 cm)   Weight Weight: 160 lb 0.9 oz (72.6 kg)        Allergies Not on File   NPO Status Diet NPO   Isolation No active isolations     MEDICAL HISTORY   Past Medical History   No past medical history on file. Surgical History    has no past surgical history on file. Social History   Social History     Occupational History    Not on file   Tobacco Use    Smoking status: Not on file   Substance and Sexual Activity    Alcohol use: Not on file    Drug use: Not on file    Sexual activity: Not on file        The medical history was obtained from medical records. The nursing notes, primary physician's notes, and old charts (if applicable) were reviewed.     LABS   Basic Metabolic Profile Recent Labs     08/14/21  1916 08/14/21  2308 08/15/21  0440    149* 155*    112* 119*   CO2 14* 22 26   BUN 10 14 17   CREATININE 1.2* 1.1 1.0   GLUCOSE 454* 291* 168*   PHOS  --  4.3 4.9   MG  --  2.00 2.30 Complete Blood Count Recent Labs     08/14/21 1916 08/14/21  2307 08/15/21  0440   WBC 5.5 7.0 14.3*   RBC 4.09 4.62 5.18      Coagulation Studies Recent Labs     08/14/21 1916   INR 1.40*        MEDICATIONS   Inpatient Medications   sodium chloride flush, 5-40 mL, Intravenous, 2 times per day    levetiracetam, 500 mg, Intravenous, Q12H   Infusions    niCARdipine 2.5 mg/hr (08/15/21 0600)    sodium chloride      norepinephrine Stopped (08/14/21 2329)    [Held by provider] sodium chloride Stopped (08/15/21 0109)      PRN   sodium chloride flush, 5-40 mL, Intravenous, PRN  sodium chloride, 25 mL, Intravenous, PRN  acetaminophen, 650 mg, Oral, Q4H PRN       Antibiotics   Recent Abx Admin      No antibiotic orders with administrations found. PHYSICAL EXAMINATION     GCS E-1 (eyes closed, fixed dilated pupils 8mm, no corneals)  V-T   M-1 (No movement to central pain, does not overbreath vent)    IMAGING   I personally reviewed the patient's imaging which consists of CTs of the head dated 8/14/2021 and 8/15/2021. These demosntrate a large hemorrhage in the posterior fossa in the vermis measuring 3.6 x 5.7 x 3.6 cm in size. There is extension or compression of the 4th ventricle without hydrocephalus. Global loss of grey-white margination in seen. The brainstem appears hypodense. ASSESSMENT & PLAN     50 yo woman with large posterior fossa ICH who was coded in the field for >1 hrs in V Fib prior to ROSC, GCS 3T with absent brainstem reflexes. Plan:  -No role for neurosurgical intervention  -Palliative care consultation  -Critical care consulted for brain death evaluation  -HOB elevated    Thank you for the consultation.     Mallory Matthews MD, PhD  [address], [address] [place], [place], [address] [phone] (c), [phone] (o)

## 2021-08-15 NOTE — PROCEDURES
James Jarvis is a 49 y.o. female patient. No diagnosis found. No past medical history on file. Blood pressure (!) 131/96, pulse 112, temperature 97.9 °F (36.6 °C), temperature source Rectal, resp. rate 16, height 5' 8\" (1.727 m), weight 160 lb 0.9 oz (72.6 kg), SpO2 98 %. Insert Arterial Line    Date/Time: 8/15/2021 6:42 AM  Performed by: Henna Hurley MD  Authorized by: Henna Hurley MD   Consent: The procedure was performed in an emergent situation. Relevant documents: relevant documents present and verified  Test results: test results available and properly labeled  Site marked: the operative site was marked  Imaging studies: imaging studies available  Required items: required blood products, implants, devices, and special equipment available  Patient identity confirmed: arm band  Time out: Immediately prior to procedure a \"time out\" was called to verify the correct patient, procedure, equipment, support staff and site/side marked as required. Preparation: Patient was prepped and draped in the usual sterile fashion.   Indications: multiple ABGs, respiratory failure and hemodynamic monitoring  Location: right radial    Sedation:  Patient sedated: no    Aguila's test normal: yes  Needle gauge: 20  Seldinger technique: Seldinger technique used  Number of attempts: 1  Post-procedure: line sutured and dressing applied  Post-procedure CMS: unchanged  Patient tolerance: patient tolerated the procedure well with no immediate complications  Comments: EBL < Jairo Neumann MD  8/15/2021

## 2021-08-15 NOTE — CONSULTS
ICU [address] [facility] Day: 1    ICU Day: 1                                                        Code:Full Code  Admit Date: 8/14/2021  PCP: No primary care provider on file. CC: Cardiac arrest with cerebellar hemorrhage    HISTORY OF PRESENT ILLNESS:     Ms. Sabrina Bailon is a 81-year-old female who was admitted to the ED secondary to loss of consciousness. Past medical history includes hypertension and hep C     Per chart review patient started having a headache around 10:30 AM and was described as a migraine per patient's fiancé and daughter. She had been taken some naproxen which did not relieve any of her symptoms. Her fiancé stated that she was screaming that her headache was so bad and being exacerbated by noises and bright lights. She laid down on the couch and her fiancé and daughter went to work. She was last seen normal at 1:30 PM.They returned around 5:45 PM and once unarousable at that time. EMS was then called and dispatched around 6 PM on arrival patient was then cardiopulmonary arrest and ventricular fibrillation Narcan was administered and the patient was defibrillated once.       On arrival to the ED patient was still undergoing CPR and was pulseless and apneic and continued to be in V. fib. ACLS protocol was initiated the patient was intubated and ROSC was achieved. Once stabilized on Levophed patient underwent an immediate CT scan which showed a large posterior circulation intracerebral hemorrhage which neurosurgery was consulted for. Keppra was administered while in the ED. Prior to transfer to our facility the patient arrested again but was able to be resuscitated. On arrival to our facility the patient was intubated and unresponsive. She had no gag reflex but vitals were stable. She did become hypertensive and required labetalol and a nicardipine drip through the night. She otherwise had no significant events.       Patient was still unresponsive on evaluation. She lacked any gag reflex and was unresponsive to pain and external stimuli. PAST HISTORY:   No past medical history on file. No past surgical history on file. SocialHistory:   The patient lives at home     Alcohol: 1 drink daily  Illicit drugs: no use  Tobacco:  1 pack per day     Family History:  No family history on file. MEDICATIONS:     No current facility-administered medications on file prior to encounter. No current outpatient medications on file prior to encounter. Scheduled Meds:   sodium chloride flush  5-40 mL Intravenous 2 times per day    levetiracetam  500 mg Intravenous Q12H      Continuous Infusions:   niCARdipine 2.5 mg/hr (08/15/21 0600)    sodium chloride      norepinephrine Stopped (08/14/21 2329)    [Held by provider] sodium chloride Stopped (08/15/21 0109)     PRN Meds:sodium chloride flush, sodium chloride, acetaminophen    Allergies: Not on File    REVIEW OF SYSTEMS:       History obtained from chart review    Review of Systems   Unable to perform ROS: Intubated       PHYSICAL EXAM:       Vitals: BP (!) 132/105   Pulse 112   Temp 97.9 °F (36.6 °C) (Rectal)   Resp 16   Ht 5' 8\" (1.727 m)   Wt 160 lb 0.9 oz (72.6 kg)   SpO2 98%   BMI 24.34 kg/m²     I/O:      Intake/Output Summary (Last 24 hours) at 8/15/2021 0745  Last data filed at 8/15/2021 0600  Gross per 24 hour   Intake 330.49 ml   Output 3400 ml   Net -3069.51 ml     No intake/output data recorded. I/O last 3 completed shifts: In: 330.5 [I.V.:130.5; IV Piggyback:200]  Out: 3400 [Urine:3400]    Physical Examination:     Physical Exam  Constitutional:       General: She is in acute distress. Appearance: She is obese. She is ill-appearing. Cardiovascular:      Rate and Rhythm: Tachycardia present. Pulses: Normal pulses. Pulmonary:      Effort: Respiratory distress present. Breath sounds: Wheezing present. Abdominal:      General: Abdomen is flat.  Bowel sounds are normal.   Musculoskeletal:      Right lower leg: No edema. Left lower leg: No edema. Skin:     Coloration: Skin is pale. Neurological:      Cranial Nerves: Cranial nerve deficit present. Comments: No gag reflex           Access:   -Central Access Day #:  1                                  -Peripheral Access Day#: 1  -Arterial line Day#: 1                                  Hoang Day#: 1  NGT Day#: 1                                            ETT Day#: 1  Vent Settings: Vent Mode: AC/PRVC Rate Set: 16 bmp/Vt Ordered: 450 mL/ /FiO2 : 50 %    No results for input(s): PHART, ROA0CBH, PO2ART in the last 72 hours. DATA:       Labs:  CBC:   Recent Labs     08/14/21  1916 08/14/21  2307 08/15/21  0440   WBC 5.5 7.0 14.3*   HGB 12.9 15.0 16.9*   HCT 39.7 43.9 50.1*   PLT 95* 103* 120*       BMP:   Recent Labs     08/14/21  1916 08/14/21  2308 08/15/21  0440    149* 155*   K 3.2* 3.3* 4.4    112* 119*   CO2 14* 22 26   BUN 10 14 17   CREATININE 1.2* 1.1 1.0   GLUCOSE 454* 291* 168*   PHOS  --  4.3 4.9     LFT's:   Recent Labs     08/14/21 1916   AST 34   ALT 23   BILITOT 0.4   ALKPHOS 88     Troponin:   Recent Labs     08/14/21  1916 08/14/21  2308 08/15/21  0440   TROPONINI <0.01 0.42* 0.95*     BNP:No results for input(s): BNP in the last 72 hours. ABGs: No results for input(s): PHART, ERE4KDU, PO2ART in the last 72 hours. INR:   Recent Labs     08/14/21 1916   INR 1.40*       U/A:No results for input(s): NITRITE, COLORU, PHUR, LABCAST, WBCUA, RBCUA, MUCUS, TRICHOMONAS, YEAST, BACTERIA, CLARITYU, SPECGRAV, LEUKOCYTESUR, UROBILINOGEN, BILIRUBINUR, BLOODU, GLUCOSEU, AMORPHOUS in the last 72 hours. Invalid input(s): Concepción Stricklandch    CT head without contrast   Final Result        Overall decreased size of hemorrhage in the cerebellum, although still    prominent size (currently 3.3 cm, previously 5.7 cm). Intraventricular hemorrhage is again seen. No hydrocephalus.           XR CHEST 1 VIEW    (Results Pending)         ASSESSMENT AND PLAN:   Mariam Alvarez is a 49 y.o. female, who has a PMH of: HTN, HLD    Cardiopulmonary arrest likely 2/2 subarachnoid hemorrhage  LKN at 130pm Vfib for for extended period of time requiring 3 defibrillations. 45 mins of pulselessness. Additional arrest prior to arrival  -Trending Troponin's and lactate  -Echo ordered  -Levophed infusion held (originally hypotensive on presentation)     Subarachnoid Hemorrhage  LKN at 130pm. Acute posterior fossa hemorrhage causing cerebellar tonsillar herniation, superior transtentorial herniation. -NSGY consulted  -HOB 30, Q1 neuro checks  -Repeat CT at 0500  -Keppra 500mg BID   -Consulted palliative care  -Started Cardene drip     Hypernatremia  -Free fluid flushes    Neurogenic vs arrhythmogenic cardiac shock  Multiple bouts of VFib upon arrival to the ED resolved after ACLS   -Cardiac cath to evaluate is option but given her status will hold on this option at this time. Tropenemia  -EKG ordered     Afib wRVR resolved  Occurred while in the ED     HTN  -Holding home meds Lisinopril      Code Status:Full Code  FEN: Diet NPO  PPX:  SCD  DISPO: ICU    This patient has been staffed and discussed with Pérez Gan MD   -----------------------------  Milton Steel DO, PGY-1  8/15/2021  7:45 AM    Patient was seen, examined and discussed with Dr. Dangelo Burgos. I agree with the history of present illness, past medical/surgical histories, family history, social history, medication list and allergies as listed. The review of systems is as noted above. My physical exam confirms the findings listed  Chart was reviewed including labs, CXR, CT scan, EKG and medical records confirm the findings noted    Acute respiratory failure on mechanical vent support, , RR 16, FiO2 50, PEEP 5.   ABG 7.22/35/108  S/p cardiac arrest.    CT head with significant anoxic changes   Cerebellar hemorrhage with herniation   MARY JO- creatinine trended down Hypernatremia @ 155  Leukocytosis, likely reactive   Thrombocytopenia     Overall very poor prognosis   Palliative care consulted  Clinically she may be brain dead. Daughter is coming today to address goals of care before doing brain death exam   She is still full code. Pt has a high probability of imminent or life-threatening deterioration requiring close monitoring, and highly complex decision-making and/or interventions of high intensity to assess, manipulate, and support his critical organ systems to prevent a likely inevitable decline which could occur if left untreated.      A total critical care time 35 minutes was used. This includes but not limited to examining patient, collaborating with other physicians, monitoring vital signs, telemetry, continuous pulse oximetry, and clinical response to IV medications, documentation time, review and interpretation of laboratory and radiological data, review of nursing notes and old record review.  This time excludes any time that may have been spent performing procedures for life threatening organ failure.

## 2021-08-15 NOTE — ED TRIAGE NOTES
Per boyfriend and daughter pt had complained of headache 10 am this morning, family left for work and when returned found her unresponsive and called 911 no bystander CPR per ems

## 2021-08-15 NOTE — H&P
ICU HISTORY AND [address] [facility] Day: 0  ICU Day:0                                                         Code:Full Code  Admit Date: 8/14/2021    PCP: No primary care provider on file. CC: Cardiac arrest with cerebellar hemorrhage    HISTORY OF PRESENT ILLNESS:   Ms. Alma Waters is a 71-year-old female who was admitted to the ED secondary to loss of consciousness. Past medical history includes hypertension and hep C    Per chart review patient started having a headache around 10:30 AM and was described as a migraine per patient's fiancé and daughter. She had been taken some naproxen which did not relieve any of her symptoms. Her fiancé stated that she was screaming that her headache was so bad and being exacerbated by noises and bright lights. She laid down on the couch and her fiancé and daughter went to work. She was last seen normal at 1:30 PM.They returned around 5:45 PM and once unarousable at that time. EMS was then called and dispatched around 6 PM on arrival patient was then cardiopulmonary arrest and ventricular fibrillation Narcan was administered and the patient was defibrillated once. On arrival to the ED patient was still undergoing CPR and was pulseless and apneic and continued to be in V. fib. ACLS protocol was initiated the patient was intubated and ROSC was achieved. Once stabilized on Levophed patient underwent an immediate CT scan which showed a large posterior circulation intracerebral hemorrhage which neurosurgery was consulted for. Keppra was administered while in the ED. Prior to transfer to our facility the patient arrested again but was able to be resuscitated. On arrival to our facility the patient was intubated and unresponsive. She had no gag reflex but vitals were stable. Her daughter Faith Campos had arrived to our facility. On discussion no significant changes to the patient's history were to be noted.      PAST HISTORY:   No past medical history on file. No past surgical history on file. SocialHistory:   The patient lives at home    Alcohol: 1 drink daily  Illicit drugs: no use  Tobacco:  1 pack per day    Family History:  No family history on file. MEDICATIONS:     No current facility-administered medications on file prior to encounter. No current outpatient medications on file prior to encounter. Scheduled Meds:   [START ON 8/15/2021] potassium chloride  20 mEq Intravenous Q1H    sodium chloride flush  5-40 mL Intravenous 2 times per day    levetiracetam  500 mg Intravenous Q12H      Continuous Infusions:   sodium chloride      norepinephrine      sodium chloride       PRN Meds:sodium chloride flush, sodium chloride, acetaminophen    Allergies: Not on File    REVIEW OF SYSTEMS:       History obtained from chart review and unobtainable from patient due to intubation    Review of Systems   Unable to perform ROS: Patient unresponsive       PHYSICAL EXAM:       Vitals: BP (!) 155/112   Pulse 103   Resp 16   SpO2 98%     I/O:  No intake or output data in the 24 hours ending 08/14/21 2315  No intake/output data recorded. No intake/output data recorded. Physical Examination:     Physical Exam  Constitutional:       General: She is in acute distress. Appearance: She is obese. She is ill-appearing. Cardiovascular:      Rate and Rhythm: Tachycardia present. Pulses: Normal pulses. Pulmonary:      Effort: Respiratory distress present. Breath sounds: Wheezing present. Abdominal:      General: Abdomen is flat. Bowel sounds are normal.   Musculoskeletal:      Right lower leg: No edema. Left lower leg: No edema. Skin:     Coloration: Skin is pale. Neurological:      Cranial Nerves: Cranial nerve deficit present.       Comments: No gag reflex       Access:   -Central Access Day #:  1                                  -Peripheral Access Day#: 1  -Arterial line Day#: 1 Hoang Day#: 1  NGT Day#:  none                                             ETT Day#: 1  Vent Settings:   PRVC: FiO2 60% /  / Rate 16 / PEEP 5  No results for input(s): PHART, TOH8OGK, PO2ART in the last 72 hours. DATA:       Labs:  CBC:   Recent Labs     08/14/21 1916   WBC 5.5   HGB 12.9   HCT 39.7   PLT 95*       BMP:   Recent Labs     08/14/21 1916      K 3.2*      CO2 14*   BUN 10   CREATININE 1.2*   GLUCOSE 454*     LFT's:   Recent Labs     08/14/21 1916   AST 34   ALT 23   BILITOT 0.4   ALKPHOS 88     Troponin:   Recent Labs     08/14/21 1916   TROPONINI <0.01     BNP:No results for input(s): BNP in the last 72 hours. ABGs: No results for input(s): PHART, PZC3DDX, PO2ART in the last 72 hours. INR:   Recent Labs     08/14/21 1916   INR 1.40*       U/A:No results for input(s): NITRITE, COLORU, PHUR, LABCAST, WBCUA, RBCUA, MUCUS, TRICHOMONAS, YEAST, BACTERIA, CLARITYU, SPECGRAV, LEUKOCYTESUR, UROBILINOGEN, BILIRUBINUR, BLOODU, GLUCOSEU, AMORPHOUS in the last 72 hours. Invalid input(s): Windy Holder    CT head without contrast    (Results Pending)         ASSESSMENT AND PLAN:     Cardiopulmonary arrest likely 2/2 subarachnoid hemorrhage  LKN at 130pm Vfib for for extended period of time requiring 3 defibrillations. 45 mins of pulselessness. Additional arrest prior to arrival  -Trending Troponin's and lactate  -Echo ordered  -Levophed infusion    Subarachnoid Hemorrhage  LKN at 130pm. Acute posterior fossa hemorrhage causing cerebellar tonsillar herniation, superior transtentorial herniation. -NSGY consulted  -HOB 30, Q1 neuro checks  -Repeat CT at 0500  -Keppra 500mg BID (Bolus keppra in the ED  -Consulted palliative care    Shock prior to arrival: unclear if Neurogenic vs arrhythmogenic  Multiple bouts of VFib upon arrival to the ED resolved after ACLS   -Cardiac cath to evaluate is option but given her status will hold on this option at this time.   Was on levophed on arrival. Weaned off and currently off  Resolved    Afib wRVR resolved  Occurred while in the ED    HTN  -Holding home meds Lisinopril    Code Status:Full Code  FEN: Diet NPO  PPX:  SCD  DISPO: ICU    This patient has been staffed and discussed with Reza Redmond DO  -----------------------------  Hiral Cobb DO, PGY-1  8/14/2021  11:15 PM    I saw the patient independently from the resident . I discussed the care with the resident. I personally reviewed the HPI, PH, FH, SH, ROS and medications. I repeated pertinent portions of the examination and reviewed the relevant imaging and laboratory data. I agree with the findings, assessment and plan as documented. addition to: Patient is a 81-year-old male admitted for V. fib cardiac arrest, subarachnoid hemorrhage, shock that resolved. Patient is a very poor prognosis would benefit from hospice care. Palliative care consulted.   CCT: 31 minutes

## 2021-08-15 NOTE — PROGRESS NOTES
Family members at bedside. Spoke with Dr Angela Matthews from Neurology. Plan to terminally extubate soon.

## 2021-08-15 NOTE — ED TRIAGE NOTES
[phone];  Arrives to [facility] by [facility], they received unresponsive call at  [phone] ; pt found unresponsive on couch gave nasal narcan without change  when monitor first attached showed V- FIB  And  PEA . they started CPR and latter attached edward,  IV of normal saline lliter  started in left antecubital with 20  gauge angio they gave  A total of  3  Epinephrine amps,  Defibrillated her x  3  prior to their arrival with  CPR by EDWARD, and  LMA  Airway bag valve mask.   no bystander CPR was started per ems

## 2021-08-15 NOTE — CONSULTS
Clinical Pharmacy Consult Note    49 y.o. female admitted with cerebellar hemorrhage. Pharmacy has been asked by Dr. Carlos Mcdonald to adjust all drips to normal saline as appropriate based on compatibility to avoid fluid shifts since D5 is osmotically active. The following intermittent IV drips/infusions have been adjusted to saline:  Levetiracetam  Nicardippine  Norepinephrine    Total IV fluid delivered to patient over last 24h: Will assess 8/16    LT[facility], located within [facility] will follow daily to ensure all new IVPBs + drips are in NS.     Please call with questions--  Thanks--  Federico Cook, PharmD, BCPS, BCGP  [id number] (\[facility] [place] [facility]\"")   8/15/2021 7:22 AM

## 2021-08-16 LAB
BASE EXCESS ARTERIAL: -3 (ref -3–3)
CALCIUM IONIZED: 1.29 MMOL/L (ref 1.12–1.32)
GLUCOSE BLD-MCNC: 313 MG/DL (ref 70–99)
HCO3 ARTERIAL: 23.8 MMOL/L (ref 21–29)
LACTATE: 2.52 MMOL/L (ref 0.4–2)
O2 SAT, ARTERIAL: 100 % (ref 93–100)
PCO2 ARTERIAL: 48.5 MM HG (ref 35–45)
PERFORMED ON: ABNORMAL
PH ARTERIAL: 7.3 (ref 7.35–7.45)
PO2 ARTERIAL: 180.3 MM HG (ref 75–108)
POC POTASSIUM: 3.2 MMOL/L (ref 3.5–5.1)
POC SAMPLE TYPE: ABNORMAL
TCO2 ARTERIAL: 25 MMOL/L